# Patient Record
Sex: MALE | Race: WHITE | Employment: FULL TIME | URBAN - METROPOLITAN AREA
[De-identification: names, ages, dates, MRNs, and addresses within clinical notes are randomized per-mention and may not be internally consistent; named-entity substitution may affect disease eponyms.]

---

## 2023-04-09 PROBLEM — R07.9 CHEST PAIN: Status: ACTIVE | Noted: 2023-04-09

## 2023-04-11 PROBLEM — R09.89 CORONARY ARTERY DISEASE WITH CARDIAC SYMPTOMS: Status: ACTIVE | Noted: 2023-04-11

## 2023-04-11 PROBLEM — I25.10 CORONARY ARTERY DISEASE WITH CARDIAC SYMPTOMS: Status: ACTIVE | Noted: 2023-04-11

## 2024-10-03 ENCOUNTER — INITIAL CONSULT (OUTPATIENT)
Dept: VASCULAR SURGERY | Age: 54
End: 2024-10-03
Payer: COMMERCIAL

## 2024-10-03 VITALS
OXYGEN SATURATION: 99 % | WEIGHT: 146 LBS | HEART RATE: 79 BPM | HEIGHT: 68 IN | BODY MASS INDEX: 22.13 KG/M2 | RESPIRATION RATE: 18 BRPM | SYSTOLIC BLOOD PRESSURE: 118 MMHG | DIASTOLIC BLOOD PRESSURE: 76 MMHG

## 2024-10-03 DIAGNOSIS — I65.23 CAROTID STENOSIS, ASYMPTOMATIC, BILATERAL: Primary | ICD-10-CM

## 2024-10-03 PROCEDURE — 99203 OFFICE O/P NEW LOW 30 MIN: CPT | Performed by: SURGERY

## 2024-10-03 NOTE — PROGRESS NOTES
Division of Vascular Surgery        New Consult      Physician Requesting Consult:  Dr. KRISTA Rothman    Reason for Consult:   Carotid stenosis    Chief Complaint:      Carotid stenosis    History of Present Illness:      Woody Mckeon is a 54 y.o. gentleman who presents with asymptomatic bilateral carotid stenosis on workup for carotid bruit in cardiologist office.  Denies any unilateral weakness, facial droop, thick/slurred speech or symptoms of amaurosis fugax.  Denies any symptoms of lower extremity claudication or ischemic rest pain.  He is medically optimized with antiplatelet and statin therapy.    Medical History:     Past Medical History:   Diagnosis Date    Arrhythmia     Hypertension        Surgical History:     No past surgical history on file.    Family History:     Family History   Problem Relation Age of Onset    Heart Disease Mother     Heart Surgery Brother        Allergies:       Ciprofloxacin    Medications:      Current Outpatient Medications   Medication Sig Dispense Refill    clopidogrel (PLAVIX) 75 MG tablet TAKE 1 TABLET BY MOUTH DAILY 30 tablet 5    aspirin (ASPIRIN LOW DOSE) 81 MG chewable tablet CHEW 1 TABLET BY MOUTH DAILY 30 tablet 5    atorvastatin (LIPITOR) 40 MG tablet TAKE 1 TABLET BY MOUTH DAILY 30 tablet 0    rosuvastatin (CRESTOR) 40 MG tablet Take 1 tablet by mouth every evening 90 tablet 3    buPROPion (WELLBUTRIN SR) 150 MG extended release tablet 2 tablets daily      pantoprazole (PROTONIX) 40 MG tablet       sacubitril-valsartan (ENTRESTO) 24-26 MG per tablet Take 0.5 tablets by mouth 2 times daily 60 tablet 3    Multiple Vitamins-Minerals (CENTRUM ADULTS PO) DAILY      nitroGLYCERIN (NITROSTAT) 0.4 MG SL tablet Place 1 tablet under the tongue every 5 minutes as needed for Chest pain up to max of 3 total doses. If no relief after 1 dose, call 911. 25 tablet 3    Multiple Vitamins-Minerals (THERAPEUTIC MULTIVITAMIN-MINERALS) tablet Take 1 tablet by mouth daily      Misc Natural

## 2024-10-10 ASSESSMENT — ENCOUNTER SYMPTOMS
CHEST TIGHTNESS: 0
ALLERGIC/IMMUNOLOGIC NEGATIVE: 1
COLOR CHANGE: 0
SHORTNESS OF BREATH: 0
ABDOMINAL PAIN: 0